# Patient Record
Sex: FEMALE | Race: WHITE | NOT HISPANIC OR LATINO | URBAN - METROPOLITAN AREA
[De-identification: names, ages, dates, MRNs, and addresses within clinical notes are randomized per-mention and may not be internally consistent; named-entity substitution may affect disease eponyms.]

---

## 2017-01-02 NOTE — PATIENT DISCUSSION
1.  Pseudophakia OU - IOLs stable. Monitor. Had cat sx in CT 2011/2012. 2. PCO  OS (Posterior Capsule Opacification)   PCO is visually significant and impairment of vision does not meet the patient’s functional needs or interferes with activities of daily living. Schedule Yag eval OS with DR Bergman. and 1 week PO with DR Wesley Hurst after Yag. 3.  Trace ARMD OU dry - Importance of smoking cessation blood pressure control and healthy diet were emphasized. In accordance with the AREDS study a good multivitamin containing EC and Zinc were recommened to be taken daily. Patient was instructed to self monitor their monocular vision (reading/Amsler Grid) at least weekly. Patient should immediately report any new onset of decreased vision or metamorphopsia. 4. Return for an appointment in 6 months for comprehensive exam. with Dr. Samir Bangura.

## 2017-03-13 NOTE — PATIENT DISCUSSION
1.  Pseudophakia OS- s/p YAG laser capsulotomy for Posterior Capsule Opacification (PCO) in the Left Eye (OS). Please contact us if you have a significant change in symptoms such as flashes of light (photopsia) increased floaters decrease/loss of visual field or visual acuity. Minimal rx change in glasses2.   RTN  4 mths DFTA

## 2017-07-12 NOTE — PATIENT DISCUSSION
1.  Pseudophakia OU - IOLs stable. Monitor. Open capsules OS clear OD. Had cat sx in CT 2011/2012. 2. Trace ARMD OU dry - Importance of smoking cessation blood pressure control and healthy diet were emphasized. In accordance with the AREDS study a good multivitamin containing EC and Zinc were recommened to be taken daily. Patient was instructed to self monitor their monocular vision (reading/Amsler Grid) at least weekly. Patient should immediately report any new onset of decreased vision or metamorphopsia. 3. Dry Eyes- start using tears qid systane every day. Zaditor prn for allergies. Eyes itch alot. 4.  Return  1 mth OC for dryness.   Pt was not aware she had AMD. Prob withh her glasses-

## 2017-08-14 NOTE — PATIENT DISCUSSION
1.  Pseudophakia OU - IOLs stable. Monitor. Open capsules OS clear OD. Had cat sx in CT 2011/2012. 2. Trace ARMD OU dry - Importance of smoking cessation blood pressure control and healthy diet were emphasized. In accordance with the AREDS study a good multivitamin containing EC and Zinc were recommened to be taken daily. Patient was instructed to self monitor their monocular vision (reading/Amsler Grid) at least weekly. Patient should immediately report any new onset of decreased vision or metamorphopsia. 3. Dry Eyes- Cont  with tears qid systane every day. Zaditor prn for allergies. Eyes itch but better. Pt will see allergist or talk to her GP about any OTC allergy meds. Pt has to be careful due to BP meds. 4.  Return  12/17 CE OC5. RTN 6/18 CE  Pt was not aware she had AMD. Prob withh her glasses-  Not fitting-  exchange frame for diff pr. They hurt her nose.

## 2017-08-29 ENCOUNTER — IMPORTED ENCOUNTER (OUTPATIENT)
Dept: URBAN - METROPOLITAN AREA CLINIC 50 | Facility: CLINIC | Age: 77
End: 2017-08-29

## 2017-08-30 ENCOUNTER — IMPORTED ENCOUNTER (OUTPATIENT)
Dept: URBAN - METROPOLITAN AREA CLINIC 50 | Facility: CLINIC | Age: 77
End: 2017-08-30

## 2017-09-20 ENCOUNTER — IMPORTED ENCOUNTER (OUTPATIENT)
Dept: URBAN - METROPOLITAN AREA CLINIC 50 | Facility: CLINIC | Age: 77
End: 2017-09-20

## 2017-10-06 ENCOUNTER — IMPORTED ENCOUNTER (OUTPATIENT)
Dept: URBAN - METROPOLITAN AREA CLINIC 50 | Facility: CLINIC | Age: 77
End: 2017-10-06

## 2017-10-06 NOTE — PATIENT DISCUSSION
"""S/P IOL OD: Tecnis ZCB00 20.0 (Target: Distance) +TM/Omidria. Continue post operative instructions and drops per schedule.  """

## 2017-10-11 ENCOUNTER — IMPORTED ENCOUNTER (OUTPATIENT)
Dept: URBAN - METROPOLITAN AREA CLINIC 50 | Facility: CLINIC | Age: 77
End: 2017-10-11

## 2017-11-08 ENCOUNTER — IMPORTED ENCOUNTER (OUTPATIENT)
Dept: URBAN - METROPOLITAN AREA CLINIC 50 | Facility: CLINIC | Age: 77
End: 2017-11-08

## 2017-12-12 NOTE — PATIENT DISCUSSION
Allergic Conjunctivitis OU -- The condition was  discussed with the patient. Avoidance of allergens and cool compresses were recommended.

## 2017-12-12 NOTE — PATIENT DISCUSSION
1.  Pseudophakia OU - IOLs stable. Monitor. Open capsules OS clear OD. Had cat sx in CT 2011/2012. 2. Trace ARMD OU dry - Importance of smoking cessation blood pressure control and healthy diet were emphasized. In accordance with the AREDS study a good multivitamin containing EC and Zinc were recommened to be taken daily. Patient was instructed to self monitor their monocular vision . 3. Dry Eyes- Cont  with tears qid systane every day. Zaditor prn for allergies. Eyes itch but better. Pt saw an allergist and recommended Claritan but pt did not take. Pt has to be careful due to BP meds. 4.  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 5.  RTN  1/18 DFTA. MRX done on 12/12/17 did not help left eye. 6.  RTN 6/18 CE  Pt was not aware she had AMD. Prob withh her glasses-  Not fitting-  exchange frame for diff pr. They hurt her nose.

## 2018-01-04 NOTE — PATIENT DISCUSSION
1.  Pseudophakia OU - IOLs stable. Monitor. Open capsules OS clear OD. Had cat sx in CT 2011/2012. 2. ARMD OU dry - Importance of No smoking blood pressure control and healthy diet were emphasized. Multivitamin containing lutein Maxivision to be taken daily. 3.  Dry Eyes-   Corneal nodules OS>OS. Getting in visual axis. Cont  with tears qid systane every day. Gel at bedtime. Zaditor prn for allergies. Eyes itch but better. Pt saw an allergist and recommended Claritan but pt did not take. Pt has to be careful due to BP meds. 4.  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 5.  RTN  7/18 CE. MRX done on 12/12/17 did not help left eye. Prob withh her glasses-  Pt has not been happy. We exchanged frame for lighter one. They hurt her nose.

## 2018-07-10 NOTE — PATIENT DISCUSSION
1.  Pseudophakia OU - IOLs stable. Monitor. Open capsules OS clear OD. Had cat sx in CT 2011/2012. 2. ARMD OU dry - Importance of No smoking blood pressure control and healthy diet were emphasized. Multivitamin containing lutein Maxivision to be taken daily. 3.  Dry Eyes-   Corneal nodules OS>OD. Getting in visual axis. Cont  with tears qid systane every day. Gel at bedtime. Zaditor prn for allergies. Eyes itch but better. Pt saw an allergist and recommended Claritan but pt did not take. Pt has to be careful due to BP meds. 4.  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 5.  RTN  12/18 CE. MRX done on 12/12/17 did not help left eye. Prob withh her glasses-  Pt has not been happy. We exchanged frame for lighter one. They hurt her nose. Pt wants pr of Silhouettes frameless quoted 500.

## 2019-01-11 NOTE — PATIENT DISCUSSION
1.  Pseudophakia OU - IOLs stable. Monitor. Open capsules OS clear OD. Had cat sx in CT 2011/2012. 2. ARMD OU dry - Importance of No smoking blood pressure control and healthy diet were emphasized. Multivitamin containing lutein Maxivision to be taken daily. 3.  Dry Eyes-   Corneal nodules OS>OD. Getting in visual axis. Cont  with tears qid systane every day. Gel at bedtime. Zaditor prn for allergies. Eyes itch but better. Pt saw an allergist and recommended Claritan but pt did not take. Pt has to be careful due to BP meds. 4.  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 5.  No rx change. Will put rx in old frame since it does not slide down face. 6.  RTN  9/19 CE then go to every yr. Prob withh her glasses-  Pt has not been happy. We exchanged frame for lighter one. They hurt her nose. Pt wants pr of Silhouettes frameless quoted 500.

## 2019-11-08 NOTE — PATIENT DISCUSSION
Reviewed ABMD and risk of corneal erosions and if central visual distortion.   Recommend clinical observation artificial tear lubrication during the day and Marilyn 128ung qhs

## 2019-11-08 NOTE — PATIENT DISCUSSION
1.  Pseudophakia OU - IOLs stable. Monitor. Open capsules OS clear OD. Had cat sx in CT 2011/2012. 2. ARMD OU dry - Importance of No smoking blood pressure control and healthy diet were emphasized. Multivitamin containing lutein Maxivision to be taken daily. 3.  Dry Eyes-   Corneal nodules OS>OD. Getting in visual axis. Cont  with tears qid systane every day. Gel at bedtime. Zaditor prn for allergies. Eyes itch but better. Pt saw an allergist and recommended Claritan but pt did not take. Pt has to be careful due to BP meds. 4.  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 5.  No rx change will help. Can put rx in old frame since it does not slide down face. 6.  RTN  10/20 CE/optos---FU on AMD.    Prob withh her glasses-  Pt has not been happy. We exchanged frame for lighter one. They hurt her nose. Pt wants pr of Silhouettes frameless quoted 500.

## 2020-11-09 NOTE — PATIENT DISCUSSION
1.  Pseudophakia OU - IOLs stable. Monitor. Open capsules OS clear OD. Had cat sx in CT 2011/2012. 2. ARMD OU dry - Importance of No smoking blood pressure control and healthy diet were emphasized. Multivitamin containing lutein Maxivision to be taken daily. 3.  Dry Eyes-   Corneal nodules OS>OD. Getting in visual axis. Cont  with tears qid systane every day. Gel at bedtime. Zaditor prn for allergies. Eyes itch but better. Pt saw an allergist and recommended Claritan but pt did not take. Pt has to be careful due to BP meds. 4.  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 5.  No rx change will help. Can put rx in old frame since it does not slide down face. 6.  RTN  10/21 CE/optos and OCT Mac---FU on AMD.    Prob withh her glasses-  Pt has not been happy. We exchanged frame for lighter one. They hurt her nose. Pt wants pr of Silhouettes frameless quoted 500.

## 2020-11-09 NOTE — PATIENT DISCUSSION
1.  Pseudophakia OU - IOLs stable. Monitor. Open capsules OS clear OD. Had cat sx in CT 2011/2012. 2. ARMD OU dry - Importance of No smoking blood pressure control and healthy diet were emphasized. Multivitamin containing lutein Maxivision to be taken daily. 3.  Dry Eyes-   Corneal nodules OS>OD. Getting in visual axis. Cont  with tears qid systane every day. Gel at bedtime. Zaditor prn for allergies. Eyes itch but better. Pt saw an allergist and recommended Claritan but pt did not take. Pt has to be careful due to BP meds. 4.  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 5.  No rx change will help. Can put rx in old frame since it does not slide down face. 6.  RTN  10/21 CE/optos---FU on AMD.    Prob withh her glasses-  Pt has not been happy. We exchanged frame for lighter one. They hurt her nose. Pt wants pr of Silhouettes frameless quoted 500.

## 2021-05-14 ASSESSMENT — VISUAL ACUITY
OD_PH: 20/30
OS_OTHER: 20/40. 20/40.
OD_BAT: >20/400
OD_SC: 20/50
OS_SC: 20/25
OS_BAT: 20/40
OD_BAT: >20/400
OS_SC: 20/25
OD_PH: 20/25-2
OD_CC: J1
OD_SC: 20/50-1
OS_CC: J1
OD_SC: 20/60-
OS_SC: 20/25
OD_OTHER: >20/400.
OD_OTHER: >20/400.
OS_BAT: 20/40
OS_OTHER: 20/40. 20/80.
OS_OTHER: 20/40. 20/40.
OD_SC: 20/40-
OS_BAT: 20/40
OD_SC: 20/60-
OS_SC: 20/25-

## 2021-05-14 ASSESSMENT — TONOMETRY
OD_IOP_MMHG: 16
OS_IOP_MMHG: 15
OS_IOP_MMHG: 14
OD_IOP_MMHG: 18
OD_IOP_MMHG: 13
OD_IOP_MMHG: 15
OS_IOP_MMHG: 16
OD_IOP_MMHG: 16
OS_IOP_MMHG: 18

## 2021-06-15 ENCOUNTER — NEW PATIENT COMPREHENSIVE (OUTPATIENT)
Dept: URBAN - METROPOLITAN AREA CLINIC 49 | Facility: CLINIC | Age: 81
End: 2021-06-15

## 2021-06-15 DIAGNOSIS — H25.12: ICD-10-CM

## 2021-06-15 PROCEDURE — 92134 CPTRZ OPH DX IMG PST SGM RTA: CPT

## 2021-06-15 PROCEDURE — 92133 CPTRZD OPH DX IMG PST SGM ON: CPT

## 2021-06-15 PROCEDURE — 92004 COMPRE OPH EXAM NEW PT 1/>: CPT

## 2021-06-15 ASSESSMENT — TONOMETRY
OS_IOP_MMHG: 13
OD_IOP_MMHG: 13

## 2021-06-15 ASSESSMENT — VISUAL ACUITY
OD_SC: 20/25
OS_SC: CF 5FT

## 2021-06-15 NOTE — PATIENT DISCUSSION
Possible Artery occlusion OS. Sudden vision loss OS ~ 4 weeks ago. Patient did not have time to make appointment until today. (+)APD. OCT RNFL WNL OU. OCT macula shows evident thinning/atrophy OS. BCVA CF @5ft OS. Denies GCA symptoms, denies stroke symptoms. No signs of RD. Recommend referral to Retinal specialist for further evaluation and possible FA. Recommend Carotid ultrasound, MRI, CBC/ESR/CRP to rule out systemic pathology. Scripts given to patient today. Advised to go to ER stat if stroke/GCA symptoms.

## 2021-10-04 NOTE — PATIENT DISCUSSION
1.  Pseudophakia OU - IOLs stable. Monitor. Open capsule OS clear OD. Had cat sx in CT 2011/2012. 2. ARMD OU dry -  changes in visions--Refer to Retina--OCT 10/4/21  260/275. Importance of No smoking blood pressure control and healthy diet were emphasized. Multivitamin containing lutein Maxivision to be taken daily. 3.  Dry Eyes-   Corneal nodules OS>OD. Getting in visual axis. Cont  with tears qid systane every day. Gel at bedtime. Zaditor prn for allergies. Eyes itch but better. Pt saw an allergist and recommended Claritan but pt did not take. Pt has to be careful due to BP meds. 4.  ABMD/ Superior nodules-- Will defer Marilyn 128 gurvinder qhs OS. 5.  No rx change will help. --Use magnifier  Can put rx in old frame since it does not slide down face. 6.  Discussed macular with pt and agrees to be seen by Retina for eval.7.   RTN  6mths DF/OCT Mac---FU on AMD.  8.  Refer to Retina --for eval of AMD

## 2021-11-04 NOTE — PATIENT DISCUSSION
11/4/21: Based on today's exam, diagnostic studies, and/or review of records, the determination was made for treatment today. AVASTIN 1.25mg recommended TODAY after discussion of benefits, risks and alternatives. The injection was given without complication and tolerated well by the patient. Discussed risks, benefits, and alternatives of Intravitreal Avastin. Patient understands and accepts that the Avastin is being used off label. Patient understand and accepts that the Avastin comes from a formulating pharmacy, which may increase the risk of sever vision threatening intraocular infections. Post-injection instructions were reviewed and understood by the patient. Procedure was performed without complications. Instructed to call immediately if any new distortion, blurring, decreased vision or eye pain.

## 2021-11-04 NOTE — PROCEDURE NOTE: CLINICAL
PROCEDURE NOTE: Avastin () OD. Diagnosis: Neovascular AMD with Active CNV. Anesthesia: Lidocaine 4%. Prep: Betadine Drops. Prior to injection, risks/benefits/alternatives discussed including infection, loss of vision, hemorrhage, cataract, glaucoma, retinal tears or detachment. The off-label status of Intravitreal Avastin also was reviewed. The patient wished to proceed with treatment. Topical anesthesia was induced with Alcaine. Additional anesthesia was achieved using drop(s) or injection checked above. a drop of Povidone-iodine 5% ophthalmic solution was instilled over the injection site and in the inferior fornix. Betadine prep was performed. Using the syringe provided, Avastin 1.25 mg in 0.05 cc was injected into the vitreous cavity. The needle was passed 3.0 mm posterior to the limbus in pseudophakic patients, and 3.5 mm posterior to the lumbus in phakic patients. The remainder of the Avastin in the single-use vial was then discarded in a medical waste disposal container. Unused medication was discarded. Patient tolerated procedure well. There were no complications. Injection time: 3:00PM. Post-op instructions given. Expiration Date: 1842-70-29W56:00:00. The patient was instructed to return for re-evaluation in approximately 4-12 weeks depending on his/her condition and was told to call immediately if vision decreases and/or if his/her eye becomes red, painful, and/or light sensitive. The patient was instructed to go to the emergency room or call 911 if unable to reach the doctor within an hour or two of trying or calling. The patient was instructed to use Artificial Tears q.i.d. p.r.n for comfort. Joanna Cardoza

## 2021-12-09 NOTE — PROCEDURE NOTE: CLINICAL
PROCEDURE NOTE: Avastin () OD. Diagnosis: Neovascular AMD with Active CNV. Anesthesia: Lidocaine 4%. Prep: Betadine Drops. Prior to injection, risks/benefits/alternatives discussed including infection, loss of vision, hemorrhage, cataract, glaucoma, retinal tears or detachment. The off-label status of Intravitreal Avastin also was reviewed. The patient wished to proceed with treatment. Topical anesthesia was induced with Alcaine. Additional anesthesia was achieved using drop(s) or injection checked above. a drop of Povidone-iodine 5% ophthalmic solution was instilled over the injection site and in the inferior fornix. Betadine prep was performed. Using the syringe provided, Avastin 1.25 mg in 0.05 cc was injected into the vitreous cavity. The needle was passed 3.0 mm posterior to the limbus in pseudophakic patients, and 3.5 mm posterior to the lumbus in phakic patients. The remainder of the Avastin in the single-use vial was then discarded in a medical waste disposal container. Unused medication was discarded. Patient tolerated procedure well. There were no complications. Injection time: 11:38AM. Post-op instructions given. Expiration Date: 2/13/2022. The patient was instructed to return for re-evaluation in approximately 4-12 weeks depending on his/her condition and was told to call immediately if vision decreases and/or if his/her eye becomes red, painful, and/or light sensitive. The patient was instructed to go to the emergency room or call 911 if unable to reach the doctor within an hour or two of trying or calling. The patient was instructed to use Artificial Tears q.i.d. p.r.n for comfort. Andie Zaldivar

## 2022-01-14 NOTE — PROCEDURE NOTE: CLINICAL
PROCEDURE NOTE: Avastin () OD. Diagnosis: Neovascular AMD with Active CNV. Anesthesia: Lidocaine 4%. Prep: Betadine Drops. Prior to injection, risks/benefits/alternatives discussed including infection, loss of vision, hemorrhage, cataract, glaucoma, retinal tears or detachment. The off-label status of Intravitreal Avastin also was reviewed. The patient wished to proceed with treatment. Topical anesthesia was induced with Alcaine. Additional anesthesia was achieved using drop(s) or injection checked above. a drop of Povidone-iodine 5% ophthalmic solution was instilled over the injection site and in the inferior fornix. Betadine prep was performed. Using the syringe provided, Avastin 1.25 mg in 0.05 cc was injected into the vitreous cavity. The needle was passed 3.0 mm posterior to the limbus in pseudophakic patients, and 3.5 mm posterior to the lumbus in phakic patients. The remainder of the Avastin in the single-use vial was then discarded in a medical waste disposal container. Unused medication was discarded. Patient tolerated procedure well. There were no complications. Injection time: 2:06PM. Post-op instructions given. Expiration Date: 4/6/2022. The patient was instructed to return for re-evaluation in approximately 4-12 weeks depending on his/her condition and was told to call immediately if vision decreases and/or if his/her eye becomes red, painful, and/or light sensitive. The patient was instructed to go to the emergency room or call 911 if unable to reach the doctor within an hour or two of trying or calling. The patient was instructed to use Artificial Tears q.i.d. p.r.n for comfort. Elliot Parekh

## 2022-02-18 NOTE — PROCEDURE NOTE: CLINICAL
PROCEDURE NOTE: Avastin 1.25mg OD. Diagnosis: Neovascular AMD with Active CNV. Anesthesia: Lidocaine 4%. Prep: Betadine Drops. Prior to injection, risks/benefits/alternatives discussed including infection, loss of vision, hemorrhage, cataract, glaucoma, retinal tears or detachment. The off-label status of Intravitreal Avastin also was reviewed. The patient wished to proceed with treatment. Topical anesthesia was induced with Alcaine. Additional anesthesia was achieved using drop(s) or injection checked above. A drop of Povidone-iodine 5% ophthalmic solution was instilled over the injection site and in the inferior fornix. Betadine prep was performed. Using the syringe provided, Avastin 1.25 mg in 0.05 cc was injected into the vitreous cavity. The needle was passed 3.0 mm posterior to the limbus in pseudophakic patients, and 3.5 mm posterior to the limbus in phakic patients. Patient tolerated procedure well. There were no complications. Injection time: 157PM. Lot #: [id number]. Expiration Date: [id number]:00:00. Post-op instructions given. Inventory Id: null. The patient was instructed to return for re-evaluation in approximately 4-12 weeks depending on his/her condition and was told to call immediately if vision decreases and/or if his/her eye becomes red, painful, and/or light sensitive. The patient was instructed to go to the emergency room or call 911 if unable to reach the doctor within an hour or two of trying or calling. The patient was instructed to use Artificial Tears q.i.d. p.r.n for comfort. Ashleigh Villegas PROCEDURE NOTE: Avastin 1.25mg OS. Diagnosis: Neovascular AMD with Active CNV. Anesthesia: Subconjunctival. Prep: Betadine Drops. Prior to injection, risks/benefits/alternatives discussed including infection, loss of vision, hemorrhage, cataract, glaucoma, retinal tears or detachment. The off-label status of Intravitreal Avastin also was reviewed. The patient wished to proceed with treatment. Topical anesthesia was induced with Alcaine. Additional anesthesia was achieved using drop(s) or injection checked above. A drop of Povidone-iodine 5% ophthalmic solution was instilled over the injection site and in the inferior fornix. Betadine prep was performed. Using the syringe provided, Avastin 1.25 mg in 0.05 cc was injected into the vitreous cavity. The needle was passed 3.0 mm posterior to the limbus in pseudophakic patients, and 3.5 mm posterior to the limbus in phakic patients. Patient tolerated procedure well. There were no complications. Injection time: 2:00PM. Lot #: [id number]. Expiration Date: [id number]:00:00. Post-op instructions given. Inventory Id: null. The patient was instructed to return for re-evaluation in approximately 4-12 weeks depending on his/her condition and was told to call immediately if vision decreases and/or if his/her eye becomes red, painful, and/or light sensitive. The patient was instructed to go to the emergency room or call 911 if unable to reach the doctor within an hour or two of trying or calling. The patient was instructed to use Artificial Tears q.i.d. p.r.n for comfort. Ashleigh Villegas

## 2022-02-18 NOTE — PATIENT DISCUSSION
2/18/22: Continue to MONITOR CLOSELY to determine the need for TREATMENT and INCREASE/DECREASE in length of time till next follow up visit.

## 2022-04-01 NOTE — PROCEDURE NOTE: CLINICAL
PROCEDURE NOTE: Avastin 1.25mg OD. Diagnosis: Neovascular AMD with Active CNV. Anesthesia: Topical/Subconjunctival. Prep: Betadine Drops. Prior to injection, risks/benefits/alternatives discussed including infection, loss of vision, hemorrhage, cataract, glaucoma, retinal tears or detachment. The off-label status of Intravitreal Avastin also was reviewed. The patient wished to proceed with treatment. Topical anesthesia was induced with Alcaine. Additional anesthesia was achieved using drop(s) or injection checked above. A drop of Povidone-iodine 5% ophthalmic solution was instilled over the injection site and in the inferior fornix. Betadine prep was performed. Using the syringe provided, Avastin 1.25 mg in 0.05 cc was injected into the vitreous cavity. The needle was passed 3.0 mm posterior to the limbus in pseudophakic patients, and 3.5 mm posterior to the limbus in phakic patients. Patient tolerated procedure well. There were no complications. Injection time: 213PM. Lot #: [id number]. Expiration Date: 6/30/2022. Post-op instructions given. Inventory Id: null. The patient was instructed to return for re-evaluation in approximately 4-12 weeks depending on his/her condition and was told to call immediately if vision decreases and/or if his/her eye becomes red, painful, and/or light sensitive. The patient was instructed to go to the emergency room or call 911 if unable to reach the doctor within an hour or two of trying or calling. The patient was instructed to use Artificial Tears q.i.d. p.r.n for comfort. Mendy Avalos PROCEDURE NOTE: Avastin 1.25mg OS. Diagnosis: Neovascular AMD with Active CNV. Anesthesia: Topical/Subconjunctival. Prep: Betadine Drops. Prior to injection, risks/benefits/alternatives discussed including infection, loss of vision, hemorrhage, cataract, glaucoma, retinal tears or detachment. The off-label status of Intravitreal Avastin also was reviewed. The patient wished to proceed with treatment. Topical anesthesia was induced with Alcaine. Additional anesthesia was achieved using drop(s) or injection checked above. A drop of Povidone-iodine 5% ophthalmic solution was instilled over the injection site and in the inferior fornix. Betadine prep was performed. Using the syringe provided, Avastin 1.25 mg in 0.05 cc was injected into the vitreous cavity. The needle was passed 3.0 mm posterior to the limbus in pseudophakic patients, and 3.5 mm posterior to the limbus in phakic patients. Patient tolerated procedure well. There were no complications. Injection time: 212PM. Lot #: [id number]. Expiration Date: 6/26/2022. Post-op instructions given. Inventory Id: null. The patient was instructed to return for re-evaluation in approximately 4-12 weeks depending on his/her condition and was told to call immediately if vision decreases and/or if his/her eye becomes red, painful, and/or light sensitive. The patient was instructed to go to the emergency room or call 911 if unable to reach the doctor within an hour or two of trying or calling. The patient was instructed to use Artificial Tears q.i.d. p.r.n for comfort. Mendy Avalos fever, cough, SOB COPD exacerbation

## 2022-05-13 NOTE — PROCEDURE NOTE: CLINICAL
PROCEDURE NOTE: Avastin 1.25mg OD. Diagnosis: Neovascular AMD with Active CNV. Anesthesia: Subconjunctival. Prep: Betadine Drops. Prior to injection, risks/benefits/alternatives discussed including infection, loss of vision, hemorrhage, cataract, glaucoma, retinal tears or detachment. The off-label status of Intravitreal Avastin also was reviewed. The patient wished to proceed with treatment. Topical anesthesia was induced with Alcaine. Additional anesthesia was achieved using drop(s) or injection checked above. A drop of Povidone-iodine 5% ophthalmic solution was instilled over the injection site and in the inferior fornix. Betadine prep was performed. Using the syringe provided, Avastin 1.25 mg in 0.05 cc was injected into the vitreous cavity. The needle was passed 3.0 mm posterior to the limbus in pseudophakic patients, and 3.5 mm posterior to the limbus in phakic patients. Patient tolerated procedure well. There were no complications. Injection time: 1:15PM. Lot #: [id number]. Expiration Date: 8/3/2022. Post-op instructions given. Inventory Id: null. The patient was instructed to return for re-evaluation in approximately 4-12 weeks depending on his/her condition and was told to call immediately if vision decreases and/or if his/her eye becomes red, painful, and/or light sensitive. The patient was instructed to go to the emergency room or call 911 if unable to reach the doctor within an hour or two of trying or calling. The patient was instructed to use Artificial Tears q.i.d. p.r.n for comfort. Morena Hernandez PROCEDURE NOTE: Avastin 1.25mg OS. Diagnosis: Neovascular AMD with Active CNV. Anesthesia: Subconjunctival. Prep: Betadine Drops. Prior to injection, risks/benefits/alternatives discussed including infection, loss of vision, hemorrhage, cataract, glaucoma, retinal tears or detachment. The off-label status of Intravitreal Avastin also was reviewed. The patient wished to proceed with treatment. Topical anesthesia was induced with Alcaine. Additional anesthesia was achieved using drop(s) or injection checked above. A drop of Povidone-iodine 5% ophthalmic solution was instilled over the injection site and in the inferior fornix. Betadine prep was performed. Using the syringe provided, Avastin 1.25 mg in 0.05 cc was injected into the vitreous cavity. The needle was passed 3.0 mm posterior to the limbus in pseudophakic patients, and 3.5 mm posterior to the limbus in phakic patients. Patient tolerated procedure well. There were no complications. Injection time: 1:15PM. Lot #: [id number]. Expiration Date: 8/10/2022. Post-op instructions given. Inventory Id: null. The patient was instructed to return for re-evaluation in approximately 4-12 weeks depending on his/her condition and was told to call immediately if vision decreases and/or if his/her eye becomes red, painful, and/or light sensitive. The patient was instructed to go to the emergency room or call 911 if unable to reach the doctor within an hour or two of trying or calling. The patient was instructed to use Artificial Tears q.i.d. p.r.n for comfort. Morena Hernandez

## 2022-06-24 NOTE — PROCEDURE NOTE: CLINICAL
PROCEDURE NOTE: Avastin 1.25mg OD. Diagnosis: Neovascular AMD with Active CNV. Anesthesia: Subconjunctival. Prep: Betadine Drops. Prior to injection, risks/benefits/alternatives discussed including infection, loss of vision, hemorrhage, cataract, glaucoma, retinal tears or detachment. The off-label status of Intravitreal Avastin also was reviewed. The patient wished to proceed with treatment. Topical anesthesia was induced with Alcaine. Additional anesthesia was achieved using drop(s) or injection checked above. A drop of Povidone-iodine 5% ophthalmic solution was instilled over the injection site and in the inferior fornix. Betadine prep was performed. Using the syringe provided, Avastin 1.25 mg in 0.05 cc was injected into the vitreous cavity. The needle was passed 3.0 mm posterior to the limbus in pseudophakic patients, and 3.5 mm posterior to the limbus in phakic patients. Patient tolerated procedure well. There were no complications. Injection time: 1:30pm. Lot #: [id number]. Expiration Date: 9/9/2022. Post-op instructions given. Inventory Id: null. The patient was instructed to return for re-evaluation in approximately 4-12 weeks depending on his/her condition and was told to call immediately if vision decreases and/or if his/her eye becomes red, painful, and/or light sensitive. The patient was instructed to go to the emergency room or call 911 if unable to reach the doctor within an hour or two of trying or calling. The patient was instructed to use Artificial Tears q.i.d. p.r.n for comfort. Yakov Jackson PROCEDURE NOTE: Avastin 1.25mg OS. Diagnosis: Neovascular AMD with Active CNV. Anesthesia: Subconjunctival. Prep: Betadine Drops. Prior to injection, risks/benefits/alternatives discussed including infection, loss of vision, hemorrhage, cataract, glaucoma, retinal tears or detachment. The off-label status of Intravitreal Avastin also was reviewed. The patient wished to proceed with treatment. Topical anesthesia was induced with Alcaine. Additional anesthesia was achieved using drop(s) or injection checked above. A drop of Povidone-iodine 5% ophthalmic solution was instilled over the injection site and in the inferior fornix. Betadine prep was performed. Using the syringe provided, Avastin 1.25 mg in 0.05 cc was injected into the vitreous cavity. The needle was passed 3.0 mm posterior to the limbus in pseudophakic patients, and 3.5 mm posterior to the limbus in phakic patients. Patient tolerated procedure well. There were no complications. Injection time: 1:30PM. Lot #: [id number]. Expiration Date: 9/9/2022. Post-op instructions given. Inventory Id: null. The patient was instructed to return for re-evaluation in approximately 4-12 weeks depending on his/her condition and was told to call immediately if vision decreases and/or if his/her eye becomes red, painful, and/or light sensitive. The patient was instructed to go to the emergency room or call 911 if unable to reach the doctor within an hour or two of trying or calling. The patient was instructed to use Artificial Tears q.i.d. p.r.n for comfort. Yakov Jackson

## 2022-08-12 NOTE — PROCEDURE NOTE: CLINICAL
PROCEDURE NOTE: Avastin 1.25mg OD. Diagnosis: Neovascular AMD with Active CNV. Anesthesia: Subconjunctival. Prep: Betadine Drops. Prior to injection, risks/benefits/alternatives discussed including infection, loss of vision, hemorrhage, cataract, glaucoma, retinal tears or detachment. The off-label status of Intravitreal Avastin also was reviewed. The patient wished to proceed with treatment. Topical anesthesia was induced with Alcaine. Additional anesthesia was achieved using drop(s) or injection checked above. A drop of Povidone-iodine 5% ophthalmic solution was instilled over the injection site and in the inferior fornix. Betadine prep was performed. Using the syringe provided, Avastin 1.25 mg in 0.05 cc was injected into the vitreous cavity. The needle was passed 3.0 mm posterior to the limbus in pseudophakic patients, and 3.5 mm posterior to the limbus in phakic patients. Patient tolerated procedure well. There were no complications. Injection time: 3;20PM. Lot #: [id number]. Expiration Date: 11/12/2022. Post-op instructions given. Inventory Id: null. The patient was instructed to return for re-evaluation in approximately 4-12 weeks depending on his/her condition and was told to call immediately if vision decreases and/or if his/her eye becomes red, painful, and/or light sensitive. The patient was instructed to go to the emergency room or call 911 if unable to reach the doctor within an hour or two of trying or calling. The patient was instructed to use Artificial Tears q.i.d. p.r.n for comfort. Lynne Dent PROCEDURE NOTE: Avastin 1.25mg OS. Diagnosis: Neovascular AMD with Active CNV. Anesthesia: Subconjunctival. Prep: Betadine Drops. Prior to injection, risks/benefits/alternatives discussed including infection, loss of vision, hemorrhage, cataract, glaucoma, retinal tears or detachment. The off-label status of Intravitreal Avastin also was reviewed. The patient wished to proceed with treatment. Topical anesthesia was induced with Alcaine. Additional anesthesia was achieved using drop(s) or injection checked above. A drop of Povidone-iodine 5% ophthalmic solution was instilled over the injection site and in the inferior fornix. Betadine prep was performed. Using the syringe provided, Avastin 1.25 mg in 0.05 cc was injected into the vitreous cavity. The needle was passed 3.0 mm posterior to the limbus in pseudophakic patients, and 3.5 mm posterior to the limbus in phakic patients. Patient tolerated procedure well. There were no complications. Injection time: 3;20PM. Lot #: [id number]. Expiration Date: 11/13/2022. Post-op instructions given. Inventory Id: null. The patient was instructed to return for re-evaluation in approximately 4-12 weeks depending on his/her condition and was told to call immediately if vision decreases and/or if his/her eye becomes red, painful, and/or light sensitive. The patient was instructed to go to the emergency room or call 911 if unable to reach the doctor within an hour or two of trying or calling. The patient was instructed to use Artificial Tears q.i.d. p.r.n for comfort. Lynne Terry

## 2022-10-14 NOTE — PROCEDURE NOTE: CLINICAL
PROCEDURE NOTE: Avastin 1.25mg OD. Diagnosis: Neovascular AMD with Active CNV. Anesthesia: Subconjunctival. Prep: Betadine Drops. Prior to injection, risks/benefits/alternatives discussed including infection, loss of vision, hemorrhage, cataract, glaucoma, retinal tears or detachment. The off-label status of Intravitreal Avastin also was reviewed. The patient wished to proceed with treatment. Topical anesthesia was induced with Alcaine. Additional anesthesia was achieved using drop(s) or injection checked above. A drop of Povidone-iodine 5% ophthalmic solution was instilled over the injection site and in the inferior fornix. Betadine prep was performed. Using the syringe provided, Avastin 1.25 mg in 0.05 cc was injected into the vitreous cavity. The needle was passed 3.0 mm posterior to the limbus in pseudophakic patients, and 3.5 mm posterior to the limbus in phakic patients. Patient tolerated procedure well. There were no complications. Injection time: *. Lot #: [id number]. Expiration Date: 8/2023. Post-op instructions given. Inventory Id: null. The patient was instructed to return for re-evaluation in approximately 4-12 weeks depending on his/her condition and was told to call immediately if vision decreases and/or if his/her eye becomes red, painful, and/or light sensitive. The patient was instructed to go to the emergency room or call 911 if unable to reach the doctor within an hour or two of trying or calling. The patient was instructed to use Artificial Tears q.i.d. p.r.n for comfort. Yuri Morley PROCEDURE NOTE: Avastin 1.25mg OS. Diagnosis: Neovascular AMD with Active CNV. Anesthesia: Subconjunctival. Prep: Betadine Drops. Prior to injection, risks/benefits/alternatives discussed including infection, loss of vision, hemorrhage, cataract, glaucoma, retinal tears or detachment. The off-label status of Intravitreal Avastin also was reviewed. The patient wished to proceed with treatment. Topical anesthesia was induced with Alcaine. Additional anesthesia was achieved using drop(s) or injection checked above. A drop of Povidone-iodine 5% ophthalmic solution was instilled over the injection site and in the inferior fornix. Betadine prep was performed. Using the syringe provided, Avastin 1.25 mg in 0.05 cc was injected into the vitreous cavity. The needle was passed 3.0 mm posterior to the limbus in pseudophakic patients, and 3.5 mm posterior to the limbus in phakic patients. Patient tolerated procedure well. There were no complications. Injection time: *. Lot #: [id number]. Expiration Date: 8/2023. Post-op instructions given. Inventory Id: null. The patient was instructed to return for re-evaluation in approximately 4-12 weeks depending on his/her condition and was told to call immediately if vision decreases and/or if his/her eye becomes red, painful, and/or light sensitive. The patient was instructed to go to the emergency room or call 911 if unable to reach the doctor within an hour or two of trying or calling. The patient was instructed to use Artificial Tears q.i.d. p.r.n for comfort. Yuri Morley

## 2022-12-09 NOTE — PROCEDURE NOTE: CLINICAL
PROCEDURE NOTE: Avastin 1.25mg OD. Diagnosis: Neovascular AMD with Active CNV. Anesthesia: Lidocaine 4%. Prep: Betadine Drops. Prior to injection, risks/benefits/alternatives discussed including infection, loss of vision, hemorrhage, cataract, glaucoma, retinal tears or detachment. The off-label status of Intravitreal Avastin also was reviewed. The patient wished to proceed with treatment. Topical anesthesia was induced with Alcaine. Additional anesthesia was achieved using drop(s) or injection checked above. A drop of Povidone-iodine 5% ophthalmic solution was instilled over the injection site and in the inferior fornix. Betadine prep was performed. Using the syringe provided, Avastin 1.25 mg in 0.05 cc was injected into the vitreous cavity. The needle was passed 3.0 mm posterior to the limbus in pseudophakic patients, and 3.5 mm posterior to the limbus in phakic patients. Patient tolerated procedure well. There were no complications. Injection time: 3:07PM. Lot #: [id number]. Expiration Date: 2/28/2023. Post-op instructions given. Inventory Id: null. The patient was instructed to return for re-evaluation in approximately 4-12 weeks depending on his/her condition and was told to call immediately if vision decreases and/or if his/her eye becomes red, painful, and/or light sensitive. The patient was instructed to go to the emergency room or call 911 if unable to reach the doctor within an hour or two of trying or calling. The patient was instructed to use Artificial Tears q.i.d. p.r.n for comfort. Yakov Jackson PROCEDURE NOTE: Avastin 1.25mg OS. Diagnosis: Neovascular AMD with Active CNV. Anesthesia: Lidocaine 4%. Prep: Betadine Drops. Prior to injection, risks/benefits/alternatives discussed including infection, loss of vision, hemorrhage, cataract, glaucoma, retinal tears or detachment. The off-label status of Intravitreal Avastin also was reviewed. The patient wished to proceed with treatment. Topical anesthesia was induced with Alcaine. Additional anesthesia was achieved using drop(s) or injection checked above. A drop of Povidone-iodine 5% ophthalmic solution was instilled over the injection site and in the inferior fornix. Betadine prep was performed. Using the syringe provided, Avastin 1.25 mg in 0.05 cc was injected into the vitreous cavity. The needle was passed 3.0 mm posterior to the limbus in pseudophakic patients, and 3.5 mm posterior to the limbus in phakic patients. Patient tolerated procedure well. There were no complications. Injection time: 3:07PM. Lot #: [id number]. Expiration Date: 3/7/2023. Post-op instructions given. Inventory Id: null. The patient was instructed to return for re-evaluation in approximately 4-12 weeks depending on his/her condition and was told to call immediately if vision decreases and/or if his/her eye becomes red, painful, and/or light sensitive. The patient was instructed to go to the emergency room or call 911 if unable to reach the doctor within an hour or two of trying or calling. The patient was instructed to use Artificial Tears q.i.d. p.r.n for comfort. Yakov Jackson

## 2023-01-20 NOTE — PROCEDURE NOTE: CLINICAL
PROCEDURE NOTE: Avastin 1.25mg OD. Diagnosis: Neovascular AMD with Active CNV. Anesthesia: Subconjunctival. Prep: Betadine Drops. Prior to injection, risks/benefits/alternatives discussed including infection, loss of vision, hemorrhage, cataract, glaucoma, retinal tears or detachment. The off-label status of Intravitreal Avastin also was reviewed. The patient wished to proceed with treatment. Topical anesthesia was induced with Alcaine. Additional anesthesia was achieved using drop(s) or injection checked above. A drop of Povidone-iodine 5% ophthalmic solution was instilled over the injection site and in the inferior fornix. Betadine prep was performed. Using the syringe provided, Avastin 1.25 mg in 0.05 cc was injected into the vitreous cavity. The needle was passed 3.0 mm posterior to the limbus in pseudophakic patients, and 3.5 mm posterior to the limbus in phakic patients. Patient tolerated procedure well. There were no complications. Injection time: 220PM. Lot #: [id number]. Expiration Date: 4/8/2023. Post-op instructions given. Inventory Id: null. The patient was instructed to return for re-evaluation in approximately 4-12 weeks depending on his/her condition and was told to call immediately if vision decreases and/or if his/her eye becomes red, painful, and/or light sensitive. The patient was instructed to go to the emergency room or call 911 if unable to reach the doctor within an hour or two of trying or calling. The patient was instructed to use Artificial Tears q.i.d. p.r.n for comfort. Elliot Parekh PROCEDURE NOTE: Eylea PFS OS. Diagnosis: Neovascular AMD with Active CNV. Anesthesia: Subconjunctival. Prep: Betadine Drops. Prior to injection, risks/benefits/alternatives discussed including but not limited to infection, loss of vision or eye, hemorrhage, cataract, glaucoma, retinal tears or detachment. The patient wished to proceed with treatment. Betadine prep was performed. Topical anesthesia was induced with Alcaine. Additional anesthesia was achieved using drop(s) or injection checked above. A drop of Povidone-iodine 5% ophthalmic solution was instilled over the injection site and in the inferior fornix. A single use prefilled syringe of intravitreal Eylea 2mg/0.05ml was used and excess was disposed of as waste. The needle was passed 3.0 mm posterior to the limbus in pseudophakic patients, and 3.5 mm posterior to the limbus in phakic patients. Injection time: 221PM.  Patient tolerated procedure well. There were no complications. The eye was irrigated with sterile irrigating solution. Post procedure instructions given. The patient was instructed to use Artificial Tears q.i.d. p.r.n for comfort. The patient was instructed to return for re-evaluation in approximately 4-12 weeks depending on his/her condition and was told to call immediately if vision decreases and/or if his/her eye becomes red, painful, and/or light sensitive. The patient was instructed to go to the emergency room or call 911 if unable to reach the doctor within an hour or two of trying or calling. Elliot Parekh

## 2023-01-20 NOTE — PROCEDURE NOTE: CLINICAL
PROCEDURE NOTE: Avastin 1.25mg OD. Diagnosis: Neovascular AMD with Active CNV. Anesthesia: Subconjunctival. Prep: Betadine Drops. Prior to injection, risks/benefits/alternatives discussed including infection, loss of vision, hemorrhage, cataract, glaucoma, retinal tears or detachment. The off-label status of Intravitreal Avastin also was reviewed. The patient wished to proceed with treatment. Topical anesthesia was induced with Alcaine. Additional anesthesia was achieved using drop(s) or injection checked above. A drop of Povidone-iodine 5% ophthalmic solution was instilled over the injection site and in the inferior fornix. Betadine prep was performed. Using the syringe provided, Avastin 1.25 mg in 0.05 cc was injected into the vitreous cavity. The needle was passed 3.0 mm posterior to the limbus in pseudophakic patients, and 3.5 mm posterior to the limbus in phakic patients. Patient tolerated procedure well. There were no complications. Injection time: 220PM. Lot #: [id number]. Expiration Date: 4/8/2023. Post-op instructions given. Inventory Id: null. The patient was instructed to return for re-evaluation in approximately 4-12 weeks depending on his/her condition and was told to call immediately if vision decreases and/or if his/her eye becomes red, painful, and/or light sensitive. The patient was instructed to go to the emergency room or call 911 if unable to reach the doctor within an hour or two of trying or calling. The patient was instructed to use Artificial Tears q.i.d. p.r.n for comfort. Elliot Parekh PROCEDURE NOTE: Eylea PFS OS. Diagnosis: Neovascular AMD with Active CNV. Anesthesia: Subconjunctival. Prep: Betadine Drops. Prior to injection, risks/benefits/alternatives discussed including but not limited to infection, loss of vision or eye, hemorrhage, cataract, glaucoma, retinal tears or detachment. The patient wished to proceed with treatment. Betadine prep was performed. Topical anesthesia was induced with Alcaine. Additional anesthesia was achieved using drop(s) or injection checked above. A drop of Povidone-iodine 5% ophthalmic solution was instilled over the injection site and in the inferior fornix. A single use prefilled syringe of intravitreal Eylea 2mg/0.05ml was used and excess was disposed of as waste. The needle was passed 3.0 mm posterior to the limbus in pseudophakic patients, and 3.5 mm posterior to the limbus in phakic patients. Injection time: 221PM.  Patient tolerated procedure well. There were no complications. The eye was irrigated with sterile irrigating solution. Post procedure instructions given. The patient was instructed to use Artificial Tears q.i.d. p.r.n for comfort. The patient was instructed to return for re-evaluation in approximately 4-12 weeks depending on his/her condition and was told to call immediately if vision decreases and/or if his/her eye becomes red, painful, and/or light sensitive. The patient was instructed to go to the emergency room or call 911 if unable to reach the doctor within an hour or two of trying or calling. Elliot Parekh

## 2024-08-05 NOTE — PATIENT DISCUSSION
College Age/Young Adult Health      Homero Valles  August 5, 2024    Visit Vitals  /88   Pulse (!) 58   Resp 16   Ht 5' 8.9\" (1.75 m)   Wt 84.9 kg (187 lb 3.2 oz)   BMI 27.73 kg/m²     Weight: 187.2 lbs      Congratulations!  As you leave high school for a working career, the , or college, you are beginning one of the most exciting times of your life.  You definitely have more independence, more responsibility, and more exposure to new ideas and experiences, both good and bad.    Most people your age are at one of the healthiest points in their lives.  Complete physical exams, for example, are recommended only every 5-10 years for both males and females.  In addition to complete exams, women need to schedule brief annual exams to ensure reproductive health.  These annual exams can be done by a gynecologist (a specialist in diseases of the female organs), or by your general physician.  Your physical exams may have been done up to now by a pediatrician.  If so, it is time to seek care from an internal medicine specialist (a physician who specializes in the non-surgical care of adults), an internal medicine pediatrician (a physician trained in the non-surgical care of both children and adults), or a family practice physician (a physician trained in the care of children and adults and in pregnancy care).    FOR THOSE STARTING A JOB:  1.  Health insurance is often a benefit provided by your employer.  If you have trouble getting insurance, start by asking your parents for advice.  2.  Make sure that you have had three doses of HEPATITIS B vaccine.  Approximately 6,000 people per year die from this viral disease of the liver.  Although Hepatitis B is often transmitted sexually or through contact with contaminated blood products, only one-half of infected individuals can identify a possible source for their infection.  There is no curative treatment, so it is very important to be vaccinated in order to  Recommended observation. prevent the disease.  3.  If you will be moving to the [facility] or [facility]S. to go to school or work, or if you plan to travel outside the [place] for work, school, or vacation, you should have two doses of HEPATITIS A vaccine.  Hepatitis A is a viral liver disease which, while less serious than Hepatitis B, still kills about 100 persons per year in the [place].  It can give you unpleasant stomach symptoms for months.  4.  Keep an emergency phone contact number in your wallet or purse.  It is not usually necessary to know your blood type, but a card listing any serious medical conditions, kept in your wallet or purse, or a medical alert bracelet can be very important.    FOR THOSE GOING INTO THE :  Your healthcare will be provided by the .    FOR THOSE GOING TO COLLEGE:  1.  A physical examination is often required before you arrive on campus.   2.  Some campuses in urban areas or campuses with many students from other countries require a tuberculosis skin test.  This is usually administered at the time of the physical exam, but it must be \"checked\" by a healthcare professional between 48 and 72 hours after it is applied.   3.  Make sure that your immunizations are up to date.  A DIPHTHERIA/ TETANUS immunization (often abbreviated dT or Td) is recommended every ten years to protect against those diseases.  Tetanus is a germ that causes a disease commonly called \"lock-jaw\" because of the severe muscle spasms associated with it.  The disease results most often from infected cuts.  The death rate, even with modern medical care, is 3 percent.  Diphtheria is a severe form of sore throat that used to cause about 5,000 deaths per year in 1900.  It still occasionally kills adults who have forgotten to keep their immunizations up to date.    4.  Most colleges require you either to be immunized against measles and chickenpox (also called Varicella) or to provide proof that you have had those  diseases.  Your parents, your doctors, and sometimes the health department will have the information that you need.  5.  Make sure that you have had three doses of HEPATITIS B vaccine.  Approximately 6,000 people per year die from this viral disease of the liver.  Although Hepatitis B is often transmitted sexually or through contact with contaminated blood products, only one-half of infected individuals can identify a possible source for their infection.  There is no curative treatment, so it is very important to be vaccinated in order to prevent the disease.  6.  If you will be moving to the [facility] or [facility].S. to go to school or work, or if you plan to travel outside the [place] for work, school, or vacation, you should have two doses of HEPATITIS A vaccine.  Hepatitis A is a viral liver disease which, while less serious than Hepatitis B, still kills about 100 persons per year in the [place].  It can give you unpleasant stomach symptoms for months.  7.  Students living in dormitories are four times more likely to contract Meningococcal Meningitis (a bacterial brain infection) than people living elsewhere in the community.  This risk applies only to the first year in the dormitory.  The risk for students living in dormitories compared with persons living elsewhere, increases from about one case per 250,000 persons per year to one case per 60,000 persons per year.  This would be about one student per year on a large college campus.  The death rate is about one in ten, and the brain damage rate is about one in three.  A single dose of either meningococcal vaccine - Menactra (R) or Menomune (R) - will lower that risk by 80 percent.  The risk of serious side effects from the vaccine is significantly lower than the risk of the illness.  8.  Take a copy of your parents' health insurance card along with you to college if you are still covered under their policy.  9.  Keep an emergency phone contact  number in your wallet or purse.  It is not usually necessary to know your blood type, but a card listing any serious medical conditions, kept in your wallet or purse, or a medical alert bracelet can be very important.    HEALTH MAINTENANCE:  There are many things to learn about and experience in young adult life.  They occur at all hours of the day and night.  It is often tempting to \"burn the candle at both ends.\"  Young adults are often so excited about these opportunities that they cheat themselves out of sleep, exercise, and a healthy diet.  When they do eat, they may overeat or eat too rapidly.    LACK OF SLEEP:  Failure to get enough sleep can cause:  1.  Low resistance to physical illness.  Many students become sick after prolonged periods with too little sleep.   2.  Low resistance to mental illness.  College and work are exciting, but any change in your life is stressful.  About 15 percent of young adults experience excessive anxiety.  Common worries include school grades, money, being away from home, and whether or not you are \"normal\" compared to the wider variety of people you meet outside your own community.  Another 15 percent suffer from depression for many of the same reasons, and sometimes without any apparent reason.  Anxiety and depression may be normal responses to stress when they last less than two weeks and correct without treatment.  3.  Poor grades at school, mistakes in your work, and increased accidents at work.  4.  Automobile accidents.  Many young people (and older people) overestimate their ability to drive safely when tired.    LACK OF EXERCISE:  Failure to get enough exercise may cause many of the same problems as lack of sleep.  Light from the sun and exercise are good ways to prevent anxiety and depression.  Exercise also helps you sleep better and improves your alertness.  This is one of the reasons that many student-athletes do well in school despite having less time for  study.    POOR DIET:  It is hard to prove that poor diet leads to short-term health problems other than \"the freshman 15\" (the average weight gain in the freshmen year of college).  For obvious reasons, this is less likely at jobs that require physical labor or in the armed services.  High-fat diets from junk food can cause grogginess, which leads some people to try to compensate with increased caffeine intake, cigarettes, or even illegal stimulants.    Menstruating females, especially athletes, may do better with a vitamin with both folic acid and iron.  About one-third of teen females are low in iron and more tired than necessary, even without being anemic.  Folic acid is necessary to prevent birth defects, and there is some evidence that it may lower the risk of heart disease.    HEALTH HAZARDS:  The top five causes of death in the young adult age range in the [place] are accidents, assault, suicide, cancer, and heart trouble.  These conditions cause 85 percent of the deaths in this age range.  Of the top three, accidents cause about 50 percent of the deaths, and assault and suicide cause another 12-15 percent each.  Cancer and heart trouble are much rarer.  The heart trouble often has been present since birth.  At this point we do not know how to prevent cancer in this age range, although we know that smoking dramatically increases your risk of cancer.  Note that the top three causes can either be prevented or treated.  Alcohol in particular is involved in almost 1,400 deaths per year in this age range.  30 percent of surveyed young adults have driven after drinking.  Please don't drink and drive.    Assault is not always preventable, but the risk can be reduced.  If you are moving to an area that is unfamiliar to you, learn safe habits:  1.  Talk to others at work or school about dangerous areas. If you are going to college, call the campus security office and ask if there have been dangerous areas on or off  campus, especially after dark.  2.  If a campus or business offers an escort service from classrooms to dorms or parking lots, use it.  One or more unfortunate incidents usually led to the establishment of the service.  3.  Never get in a car where the occupants have been drinking.  4.  If drinking will be part of an evening of entertainment, choose a \"designated .\"  5.  Females should never accept a drink or even a smoke when they did not see the source.  It may seem cool or even economical to have someone buy you drinks and cigarettes, but you run the risk of unknowingly taking \"date rape\" drugs.  Alcoholic drinks themselves are a danger in that one out of every ten college students (male and female) reports an unplanned sexual exposure after drinking.  6.  When walking on or off campus in areas that are dark, remote, or unfamiliar, travel in groups.  This applies to both males and females.  It is more fun anyway.  7.  Stick to well-lit, well-witnessed, and preferably well-patrolled areas at night.  8.  Go on \"group dates\" if you are dating and prefer dates in public, visible places.  If your date has grown up with violence as a way of solving problems, he or she may seem very nice most of the time but resort to violence when frustrated or disappointed or under stress.  Listen to what your dates say.  Watch to see if they treat others with respect.  Jealousy is associated most often with violence in males and retaliation for embarrassment in females.  It is not entirely clear how often \"date rape\" occurs.  Almost one-third of young adults have witnessed violence in their own families.  Violence in male-female relationships occurs at similar levels in high school and college.  Interestingly, milder forms of violence are perpetrated more often by females and more severe violence (with a weapon) is done more often by males.  There is more violence when males and females live together than just in the course of  dating.  In the sad event that you feel trapped in a violent relationship and cannot bring yourself to ask for help from parents or from the college, please be aware that there is a national Domestic Violence Hot Line at 1-714.701.3754.    PREGNANCY:  Pregnancy is a BIG deal.  A woman who becomes pregnant has a one in 2,000 chance of dying even with modern medical care.  Pregnancy is not a risk-free situation.  Women who get pregnant outside of marriage have a very high risk of living in poverty for much of their adult lives.  It is pretty clear that the woman bears most of the risk.  If a couple has sexual intercourse without any form of birth control in this age range during the first two weeks after the menstrual period, there is one chance in 12 that the woman will become pregnant.    About one-half of female high school seniors have had sexual intercourse by graduation.  That number increases to 70 percent in the next four years.  When birth control is used correctly, about one sexually active woman out of 100 becomes pregnant each year.  Most young adults use birth control inconsistently, which can increase the pregnancy rate to one in 20 sexually active females per year.    If you do decide to use birth control, make sure that you get it from a reputable source.  Some Internet birth control pills do not work very well.  When someone tells you that a method of birth control is \"safe,\" ask about the types of complications and how often they happen.  When someone tells you that a method of birth control is \"effective,\" ask how many women get pregnant per year when using this method.  We are not advocating birth control.  In fact, abstinence is the only completely effective way of avoiding pregnancy and venereal disease.  But if a couple chooses otherwise, it is best to choose with the most information possible.  Please do not let someone encourage you to do something against your wishes.  If they do so, you are  not getting the respect you deserve.    SEXUALLY TRANSMITTED DISEASES:   There are approximately 16 million cases per year of venereal disease in the [place] among young people between the ages of 15 and 25.  Some are brief and treatable.  Some are chronic (herpes), and can only be controlled.  Some are fatal (AIDS).  Some are treatable but have minimal symptoms, yet can lead to enough permanent damage to the reproductive organs that a woman may have trouble having children (chlamydia).  Some can cause cancer (genital warts).  Learn about them.  Avoid having multiple partners.  Try to know as much as possible about your partner if you plan to have a sexual relationship.  Stay away from people who are defensive or evasive about their background.  Avoid people who say, \"What, don't you like me?\" or \"Do I look infected?\" or any of dozens of statements that make you seem unreasonable for asking.  When you consider that about one in four persons in your age range has or has had a venereal disease, it is reasonable for you to ask questions and to get honest answers about your partner's sexual history.  Don't be cheated and don't be hurt.    SUBSTANCE ABUSE:  Smoking:  About 30 percent of college students smoke.  The smoking rate is increasing among young adults, even as it declines among other age groups.  98 percent of smokers in this age range say that they are aware of the hazards of smoking.  The reasons why people smoke anyway include advertising, role models (parents, movie stars, bosses, professors), weight control, and stimulation from the effects of nicotine.  In addition, half of college smokers blame stress, and a third use smoking as a way of self-medicating depression.  The downsides of smoking are well known.  Half of all smokers don't reach their life expectancy for a wide variety of reasons.  It is a difficult addiction to overcome.  Quitting often requires multiple attempts and may be helped by nicotine  replacement (gum or patches) or by prescription medicine.     Alcohol:  The rate of alcohol use is about the same for college students as for other high school graduates.  Almost 80 percent of young adults drink alcohol from time to time, but the rate of binge drinking is higher in college students.  Binge drinking is defined as having more than five drinks (four for females) at a sitting.  40 percent of college students binge-drink.  80 percent of fraternity members and only a slightly smaller proportion of sorority members binge-drink.  Binge drinking can be habit-forming.  One-half of males and one-third of females who engage in binge drinking as young adults still drink heavily at age 30.    Marijuana: Despite many media reports to the contrary, college students are less likely to use marijuana than others the same age.  It is likely that inhaled smoke from marijuana, like every other form of inhaled smoke -  from cigarettes, cars, factories, etc. - eventually will be found to lead to lung cancer.  Smoking marijuana causes coughing, wheezing, and bronchitis.  It makes driving dangerous and is at least psychologically addicting (\"I can't relax without it\").  When used chronically, it creates a state of apathy (stereotypically, the \"heavy doper\") that makes achievement of life goals more difficult.  Particularly if you think that you might be using marijuana to cope with intense nervousness, fearfulness, sadness, loneliness, or depression, get help from home, from your physician, from your college, or from your employee assistance program.    Other Drugs:  The most frightening illegal drugs are cocaine and amphetamines.  They are highly addictive and are associated with violent behavior and mental illness.  Because they are illegal, they are not standardized, and deaths and overdoses occur when a drug of unexpectedly high potency is used.  Both are associated with occasional strokes, high blood pressure, and  paranoia.    Sedatives can be frightening when they are not used for legitimate purposes under the supervision of a physician.  As \"date-rape\" drugs, they are often used to take advantage of others.  They have a wide variety of street names and are hard to detect by testing.  In overdose they can kill.      People who sell illegal substances are not careful about drug purity or your personal safety.  The drug dealers whom you may encounter at college or work are likely to be more dangerous than the ones who sell to high school students.    YOU NEED TO BE ESPECIALLY CAREFUL ABOUT SMOKING, DRINKING, OR DRUG USE IF THERE IS A HISTORY OF ADDICTION IN YOUR FAMILY.  DISCUSS THIS WITH YOUR PHSYICIAN.    MENTAL HEALTH:  About 15 percent of college students suffer from serious anxiety.  They may have phobias (e.g., specific fears of crowds or germs), panic attacks (rapid onset of panic and often intense fears or intense, uncomfortable trouble with breathing, racing heart, or dizziness), or general restlessness or nervousness.  Another 15 percent have depression for longer than two weeks at a time.  These statistics are not greatly different between college and non-college young adults.  These conditions do need some form of treatment.      Depression has almost one chance in five of leading to suicide if not treated.  If you tend to be sad, take an active role in treating problems when you have them.  You may want to try self-help methods such as exercise, meditation, increased time outdoors, a low-fat diet, avoiding caffeine (especially in the 6 hours prior to sleep), and avoiding unnecessarily stressful situations (intense movies, plays, dares, or dangerous situations).  If these methods do not help, or if you cannot bring yourself to try them, GET PROFESSIONAL HELP.  Get help IMMEDIATELY if you answer \"yes\" to any of the first six of the following questions or \"no,\" \"nothing,\" or \"no one\" to any of the last three  questions:    1.  Are your negative feelings strong enough that life does not seem worth living?  2.  Have you thought about suicide, and if so are the thoughts frequent or prolonged?  3.  Does it take a lot of effort to resist thoughts of suicide?  4.  Is the pain/sadness intolerable?   5.  Have you ever made any specific suicide plans? (For example, have you planned a location or method, or have you accumulated supplies?)  6.  Do you think that life after death would be much better?  7.  What holds you back?   8.  If it's getting better, could you resist suicide if thoughts returned?  9.  Who is the easiest person to turn to if thoughts of suicide come up?    WE REALIZE THAT, AS PHYSICIANS, WE TEND TO FOCUS ON DISEASES AND PROBLEMS.  MOST YOUNG ADULTS HAVE LONG AND HAPPY LIVES, AND THE NEXT YEARS FOR YOU ARE LIKELY TO BE AS INTERESTING, EXCITING, AND POSITIVE AS ANY.  WE WISH YOU THE BEST, BUT WE ARE AVAILABLE AND WOULD LIKE TO HELP IF YOU ARE HAVING PROBLEMS.    Thank you for entrusting your care to [facility].